# Patient Record
Sex: FEMALE | Race: WHITE | NOT HISPANIC OR LATINO | URBAN - METROPOLITAN AREA
[De-identification: names, ages, dates, MRNs, and addresses within clinical notes are randomized per-mention and may not be internally consistent; named-entity substitution may affect disease eponyms.]

---

## 2018-10-28 ENCOUNTER — EMERGENCY (EMERGENCY)
Facility: HOSPITAL | Age: 33
LOS: 1 days | Discharge: HOME | End: 2018-10-28
Attending: EMERGENCY MEDICINE
Payer: COMMERCIAL

## 2018-10-28 VITALS
DIASTOLIC BLOOD PRESSURE: 57 MMHG | HEART RATE: 70 BPM | TEMPERATURE: 98 F | SYSTOLIC BLOOD PRESSURE: 101 MMHG | RESPIRATION RATE: 18 BRPM | OXYGEN SATURATION: 97 %

## 2018-10-28 VITALS
TEMPERATURE: 99 F | WEIGHT: 106.04 LBS | DIASTOLIC BLOOD PRESSURE: 58 MMHG | SYSTOLIC BLOOD PRESSURE: 90 MMHG | RESPIRATION RATE: 16 BRPM | HEIGHT: 62 IN | HEART RATE: 71 BPM | OXYGEN SATURATION: 96 %

## 2018-10-28 LAB
ALBUMIN SERPL ELPH-MCNC: 4.1 G/DL — SIGNIFICANT CHANGE UP (ref 3.3–5)
ALP SERPL-CCNC: 63 U/L — SIGNIFICANT CHANGE UP (ref 40–120)
ALT FLD-CCNC: 16 U/L — SIGNIFICANT CHANGE UP (ref 10–45)
ANION GAP SERPL CALC-SCNC: 14 MMOL/L — SIGNIFICANT CHANGE UP (ref 5–17)
APPEARANCE UR: CLEAR — SIGNIFICANT CHANGE UP
AST SERPL-CCNC: 21 U/L — SIGNIFICANT CHANGE UP (ref 10–40)
BACTERIA # UR AUTO: ABNORMAL
BASOPHILS # BLD AUTO: 0.1 K/UL — SIGNIFICANT CHANGE UP (ref 0–0.2)
BASOPHILS NFR BLD AUTO: 0.7 % — SIGNIFICANT CHANGE UP (ref 0–2)
BILIRUB SERPL-MCNC: 0.2 MG/DL — SIGNIFICANT CHANGE UP (ref 0.2–1.2)
BILIRUB UR-MCNC: NEGATIVE — SIGNIFICANT CHANGE UP
BUN SERPL-MCNC: 10 MG/DL — SIGNIFICANT CHANGE UP (ref 7–23)
CALCIUM SERPL-MCNC: 9.2 MG/DL — SIGNIFICANT CHANGE UP (ref 8.4–10.5)
CHLORIDE SERPL-SCNC: 102 MMOL/L — SIGNIFICANT CHANGE UP (ref 96–108)
CO2 SERPL-SCNC: 19 MMOL/L — LOW (ref 22–31)
COLOR SPEC: SIGNIFICANT CHANGE UP
CREAT SERPL-MCNC: 0.55 MG/DL — SIGNIFICANT CHANGE UP (ref 0.5–1.3)
DIFF PNL FLD: NEGATIVE — SIGNIFICANT CHANGE UP
EOSINOPHIL # BLD AUTO: 0.2 K/UL — SIGNIFICANT CHANGE UP (ref 0–0.5)
EOSINOPHIL NFR BLD AUTO: 1.7 % — SIGNIFICANT CHANGE UP (ref 0–6)
EPI CELLS # UR: 3 /HPF — SIGNIFICANT CHANGE UP
GLUCOSE SERPL-MCNC: 85 MG/DL — SIGNIFICANT CHANGE UP (ref 70–99)
GLUCOSE UR QL: NEGATIVE — SIGNIFICANT CHANGE UP
HCG SERPL-ACNC: POSITIVE MIU/ML — SIGNIFICANT CHANGE UP
HCT VFR BLD CALC: 39.4 % — SIGNIFICANT CHANGE UP (ref 34.5–45)
HGB BLD-MCNC: 13.9 G/DL — SIGNIFICANT CHANGE UP (ref 11.5–15.5)
HYALINE CASTS # UR AUTO: 0 /LPF — SIGNIFICANT CHANGE UP (ref 0–2)
KETONES UR-MCNC: NEGATIVE — SIGNIFICANT CHANGE UP
LEUKOCYTE ESTERASE UR-ACNC: NEGATIVE — SIGNIFICANT CHANGE UP
LYMPHOCYTES # BLD AUTO: 2.4 K/UL — SIGNIFICANT CHANGE UP (ref 1–3.3)
LYMPHOCYTES # BLD AUTO: 24.2 % — SIGNIFICANT CHANGE UP (ref 13–44)
MAGNESIUM SERPL-MCNC: 2.1 MG/DL — SIGNIFICANT CHANGE UP (ref 1.6–2.6)
MCHC RBC-ENTMCNC: 33.7 PG — SIGNIFICANT CHANGE UP (ref 27–34)
MCHC RBC-ENTMCNC: 35.3 GM/DL — SIGNIFICANT CHANGE UP (ref 32–36)
MCV RBC AUTO: 95.5 FL — SIGNIFICANT CHANGE UP (ref 80–100)
MONOCYTES # BLD AUTO: 1.1 K/UL — HIGH (ref 0–0.9)
MONOCYTES NFR BLD AUTO: 11.1 % — SIGNIFICANT CHANGE UP (ref 2–14)
NEUTROPHILS # BLD AUTO: 6.1 K/UL — SIGNIFICANT CHANGE UP (ref 1.8–7.4)
NEUTROPHILS NFR BLD AUTO: 62.3 % — SIGNIFICANT CHANGE UP (ref 43–77)
NITRITE UR-MCNC: NEGATIVE — SIGNIFICANT CHANGE UP
PH UR: 6 — SIGNIFICANT CHANGE UP (ref 5–8)
PHOSPHATE SERPL-MCNC: 4.3 MG/DL — SIGNIFICANT CHANGE UP (ref 2.5–4.5)
PLATELET # BLD AUTO: 312 K/UL — SIGNIFICANT CHANGE UP (ref 150–400)
POTASSIUM SERPL-MCNC: 3.8 MMOL/L — SIGNIFICANT CHANGE UP (ref 3.5–5.3)
POTASSIUM SERPL-SCNC: 3.8 MMOL/L — SIGNIFICANT CHANGE UP (ref 3.5–5.3)
PROT SERPL-MCNC: 7.3 G/DL — SIGNIFICANT CHANGE UP (ref 6–8.3)
PROT UR-MCNC: SIGNIFICANT CHANGE UP
RBC # BLD: 4.13 M/UL — SIGNIFICANT CHANGE UP (ref 3.8–5.2)
RBC # FLD: 10.8 % — SIGNIFICANT CHANGE UP (ref 10.3–14.5)
RBC CASTS # UR COMP ASSIST: 23 /HPF — HIGH (ref 0–4)
SODIUM SERPL-SCNC: 135 MMOL/L — SIGNIFICANT CHANGE UP (ref 135–145)
SP GR SPEC: 1.02 — SIGNIFICANT CHANGE UP (ref 1.01–1.02)
UROBILINOGEN FLD QL: NEGATIVE — SIGNIFICANT CHANGE UP
WBC # BLD: 9.8 K/UL — SIGNIFICANT CHANGE UP (ref 3.8–10.5)
WBC # FLD AUTO: 9.8 K/UL — SIGNIFICANT CHANGE UP (ref 3.8–10.5)
WBC UR QL: 2 /HPF — SIGNIFICANT CHANGE UP (ref 0–5)

## 2018-10-28 PROCEDURE — 81001 URINALYSIS AUTO W/SCOPE: CPT

## 2018-10-28 PROCEDURE — 76817 TRANSVAGINAL US OBSTETRIC: CPT

## 2018-10-28 PROCEDURE — 87086 URINE CULTURE/COLONY COUNT: CPT

## 2018-10-28 PROCEDURE — 99284 EMERGENCY DEPT VISIT MOD MDM: CPT

## 2018-10-28 PROCEDURE — 84100 ASSAY OF PHOSPHORUS: CPT

## 2018-10-28 PROCEDURE — 80053 COMPREHEN METABOLIC PANEL: CPT

## 2018-10-28 PROCEDURE — 76817 TRANSVAGINAL US OBSTETRIC: CPT | Mod: 26

## 2018-10-28 PROCEDURE — 83735 ASSAY OF MAGNESIUM: CPT

## 2018-10-28 PROCEDURE — 84702 CHORIONIC GONADOTROPIN TEST: CPT

## 2018-10-28 PROCEDURE — 85027 COMPLETE CBC AUTOMATED: CPT

## 2018-10-28 RX ORDER — SODIUM CHLORIDE 9 MG/ML
1000 INJECTION INTRAMUSCULAR; INTRAVENOUS; SUBCUTANEOUS ONCE
Qty: 0 | Refills: 0 | Status: COMPLETED | OUTPATIENT
Start: 2018-10-28 | End: 2018-10-28

## 2018-10-28 RX ORDER — ONDANSETRON 8 MG/1
1 TABLET, FILM COATED ORAL
Qty: 12 | Refills: 0 | OUTPATIENT
Start: 2018-10-28 | End: 2018-10-30

## 2018-10-28 RX ORDER — ONDANSETRON 8 MG/1
4 TABLET, FILM COATED ORAL ONCE
Qty: 0 | Refills: 0 | Status: COMPLETED | OUTPATIENT
Start: 2018-10-28 | End: 2018-10-28

## 2018-10-28 RX ADMIN — SODIUM CHLORIDE 1000 MILLILITER(S): 9 INJECTION INTRAMUSCULAR; INTRAVENOUS; SUBCUTANEOUS at 20:36

## 2018-10-28 RX ADMIN — ONDANSETRON 4 MILLIGRAM(S): 8 TABLET, FILM COATED ORAL at 20:36

## 2018-10-28 NOTE — ED PROVIDER NOTE - ATTENDING CONTRIBUTION TO CARE
attending Nida: 32yF  at 7 weeks gestation with recent diagnosis of inevitable  last week, s/p Rhogam p/w generalized malaise, nausea and abdominal cramping. Following with OBGYN with possible plan for D&C if fetus does not pass spontaneously. Reports scant vaginal spotting. No syncope. On exam, well-appearing, VSS, abdomen soft/NT. Will obtain labs, IVF, symptomatic treatment, TVUS, UA and reassess

## 2018-10-28 NOTE — ED PROVIDER NOTE - CARE PLAN
Principal Discharge DX:	Spontaneous   Assessment and plan of treatment:	Follow up with your OBGYN doctor within the next 3 days  Follow up with your Primary Care Physician within the next 3 days  Take Zofran 4mg oral every 6 hours as needed for nausea  Bring a copy of your test results with you to your appointment  Continue your current medication regimen  Return to the Emergency Room if you experience new or worsening symptoms

## 2018-10-28 NOTE — ED ADULT NURSE NOTE - OBJECTIVE STATEMENT
32 year old female presented to ED from home with c/o of nausea/vomiting starting today. Pt went to [place] on Wednesday after having mild bleeding and discharge, pt was 7 weeks pregnant, found no heart beat at [place]. On Thursday, pt received Rhogam. Since Thursday, pt has been nauseas, lightheaded. Pt denies CP, SOB, numbness/tingling, fever, cough, chills, blurred vision, neuro intact. Pt a&ox3, lung sounds clear, heart rate regular, abdomen soft nontender nondistended to palp. Skin intact. IV in right AC 20G and patent. pt currently has no bleeding/discharge. Will continue to monitor and assess while offering support and reassurance.

## 2018-10-28 NOTE — ED ADULT NURSE NOTE - NSIMPLEMENTINTERV_GEN_ALL_ED
Implemented All Universal Safety Interventions:  [place] to call system. Call bell, personal items and telephone within reach. Instruct patient to call for assistance. Room bathroom lighting operational. Non-slip footwear when patient is off stretcher. Physically safe environment: no spills, clutter or unnecessary equipment. Stretcher in lowest position, wheels locked, appropriate side rails in place.

## 2018-10-28 NOTE — ED PROVIDER NOTE - OBJECTIVE STATEMENT
32 year old female  at 7 weeks gestation went to [place] and had a transvaginal ultrasound which revealed a subchorionic hemorrhage and a low fetal heart rate on Wednesday. She went to her OB Dr Everardo Coughlin on Thursday who could not find a fetal heart rate, and administered Rhogam in the office. He discussed a d&c with the patient which she declined. Since then she has had nausea, intermittent back pain, abdominal cramping. She also feels very weak for the past 3 days which is worse when she stands up.

## 2018-10-28 NOTE — ED PROVIDER NOTE - PLAN OF CARE
Follow up with your OBGYN doctor within the next 3 days  Follow up with your Primary Care Physician within the next 3 days  Take Zofran 4mg oral every 6 hours as needed for nausea  Bring a copy of your test results with you to your appointment  Continue your current medication regimen  Return to the Emergency Room if you experience new or worsening symptoms

## 2018-10-29 LAB
CULTURE RESULTS: SIGNIFICANT CHANGE UP
SPECIMEN SOURCE: SIGNIFICANT CHANGE UP

## 2018-10-31 ENCOUNTER — OUTPATIENT (OUTPATIENT)
Dept: OUTPATIENT SERVICES | Facility: HOSPITAL | Age: 33
LOS: 1 days | End: 2018-10-31
Payer: COMMERCIAL

## 2018-10-31 ENCOUNTER — TRANSCRIPTION ENCOUNTER (OUTPATIENT)
Age: 33
End: 2018-10-31

## 2018-10-31 VITALS
DIASTOLIC BLOOD PRESSURE: 53 MMHG | TEMPERATURE: 99 F | WEIGHT: 106.04 LBS | SYSTOLIC BLOOD PRESSURE: 91 MMHG | HEART RATE: 68 BPM | OXYGEN SATURATION: 98 % | HEIGHT: 62 IN | RESPIRATION RATE: 16 BRPM

## 2018-10-31 DIAGNOSIS — O02.1 MISSED ABORTION: ICD-10-CM

## 2018-10-31 DIAGNOSIS — Z87.59 PERSONAL HISTORY OF OTHER COMPLICATIONS OF PREGNANCY, CHILDBIRTH AND THE PUERPERIUM: Chronic | ICD-10-CM

## 2018-10-31 LAB
ANTIBODY ID 1_1: SIGNIFICANT CHANGE UP
BLD GP AB SCN SERPL QL: POSITIVE — SIGNIFICANT CHANGE UP
RH IG SCN BLD-IMP: NEGATIVE — SIGNIFICANT CHANGE UP

## 2018-10-31 PROCEDURE — 86850 RBC ANTIBODY SCREEN: CPT

## 2018-10-31 PROCEDURE — 86900 BLOOD TYPING SEROLOGIC ABO: CPT

## 2018-10-31 PROCEDURE — 86077 PHYS BLOOD BANK SERV XMATCH: CPT

## 2018-10-31 PROCEDURE — 86870 RBC ANTIBODY IDENTIFICATION: CPT

## 2018-10-31 PROCEDURE — 86901 BLOOD TYPING SEROLOGIC RH(D): CPT

## 2018-10-31 PROCEDURE — G0463: CPT

## 2018-10-31 RX ORDER — SODIUM CHLORIDE 9 MG/ML
3 INJECTION INTRAMUSCULAR; INTRAVENOUS; SUBCUTANEOUS EVERY 8 HOURS
Qty: 0 | Refills: 0 | Status: DISCONTINUED | OUTPATIENT
Start: 2018-11-01 | End: 2018-11-16

## 2018-10-31 RX ORDER — LIDOCAINE HCL 20 MG/ML
0.2 VIAL (ML) INJECTION ONCE
Qty: 0 | Refills: 0 | Status: DISCONTINUED | OUTPATIENT
Start: 2018-11-01 | End: 2018-11-16

## 2018-10-31 NOTE — H&P PST ADULT - NSANTHOSAYNRD_GEN_A_CORE
No. MORRO screening performed.  STOP BANG Legend: 0-2 = LOW Risk; 3-4 = INTERMEDIATE Risk; 5-8 = HIGH Risk

## 2018-10-31 NOTE — H&P PST ADULT - HISTORY OF PRESENT ILLNESS
33 Y/O Female H/O migraines 31 Y/O Female  0 3 0  H/O migraines. Pt reports she is 7 weeks pregnant. Pt went to ER 10/28/18 C/O N/V , denies vaginal bleeding.  N/V resolved after pt  received IV Fluids, S/P fetal  ultra revealed no fetal heart beat. Presents for dilation & suction curettage 18. 33 Y/O Female  0 3 0  H/O migraines. Pt reports she is 7 weeks pregnant. Pt went to ER 10/28/18 C/O N/V & pelvic pain , denies vaginal bleeding.  N/V resolved after pt  received IV Fluids, S/P fetal  ultra revealed no fetal heart beat. Presents for dilation & suction curettage 18.

## 2018-11-01 ENCOUNTER — OUTPATIENT (OUTPATIENT)
Dept: OUTPATIENT SERVICES | Facility: HOSPITAL | Age: 33
LOS: 1 days | End: 2018-11-01
Payer: COMMERCIAL

## 2018-11-01 ENCOUNTER — RESULT REVIEW (OUTPATIENT)
Age: 33
End: 2018-11-01

## 2018-11-01 VITALS
SYSTOLIC BLOOD PRESSURE: 94 MMHG | RESPIRATION RATE: 14 BRPM | OXYGEN SATURATION: 100 % | HEART RATE: 87 BPM | DIASTOLIC BLOOD PRESSURE: 53 MMHG | TEMPERATURE: 100 F

## 2018-11-01 VITALS
WEIGHT: 106.04 LBS | OXYGEN SATURATION: 98 % | RESPIRATION RATE: 18 BRPM | HEIGHT: 62 IN | HEART RATE: 69 BPM | SYSTOLIC BLOOD PRESSURE: 96 MMHG | TEMPERATURE: 99 F | DIASTOLIC BLOOD PRESSURE: 63 MMHG

## 2018-11-01 DIAGNOSIS — O02.1 MISSED ABORTION: ICD-10-CM

## 2018-11-01 DIAGNOSIS — Z87.59 PERSONAL HISTORY OF OTHER COMPLICATIONS OF PREGNANCY, CHILDBIRTH AND THE PUERPERIUM: Chronic | ICD-10-CM

## 2018-11-01 PROCEDURE — 88233 TISSUE CULTURE SKIN/BIOPSY: CPT

## 2018-11-01 PROCEDURE — 59820 CARE OF MISCARRIAGE: CPT

## 2018-11-01 PROCEDURE — 88305 TISSUE EXAM BY PATHOLOGIST: CPT | Mod: 26

## 2018-11-01 PROCEDURE — 88291 CYTO/MOLECULAR REPORT: CPT

## 2018-11-01 PROCEDURE — 88264 CHROMOSOME ANALYSIS 20-25: CPT

## 2018-11-01 PROCEDURE — 88280 CHROMOSOME KARYOTYPE STUDY: CPT

## 2018-11-01 PROCEDURE — 88305 TISSUE EXAM BY PATHOLOGIST: CPT

## 2018-11-01 RX ORDER — CELECOXIB 200 MG/1
200 CAPSULE ORAL ONCE
Qty: 0 | Refills: 0 | Status: COMPLETED | OUTPATIENT
Start: 2018-11-01 | End: 2018-11-01

## 2018-11-01 RX ORDER — OXYCODONE HYDROCHLORIDE 5 MG/1
5 TABLET ORAL ONCE
Qty: 0 | Refills: 0 | Status: DISCONTINUED | OUTPATIENT
Start: 2018-11-01 | End: 2018-11-01

## 2018-11-01 RX ORDER — OXYCODONE HYDROCHLORIDE 5 MG/1
10 TABLET ORAL ONCE
Qty: 0 | Refills: 0 | Status: DISCONTINUED | OUTPATIENT
Start: 2018-11-01 | End: 2018-11-01

## 2018-11-01 RX ORDER — SODIUM CHLORIDE 9 MG/ML
1000 INJECTION, SOLUTION INTRAVENOUS
Qty: 0 | Refills: 0 | Status: DISCONTINUED | OUTPATIENT
Start: 2018-11-01 | End: 2018-11-16

## 2018-11-01 RX ORDER — ONDANSETRON 8 MG/1
4 TABLET, FILM COATED ORAL ONCE
Qty: 0 | Refills: 0 | Status: COMPLETED | OUTPATIENT
Start: 2018-11-01 | End: 2018-11-01

## 2018-11-01 RX ORDER — FAMOTIDINE 10 MG/ML
20 INJECTION INTRAVENOUS ONCE
Qty: 0 | Refills: 0 | Status: DISCONTINUED | OUTPATIENT
Start: 2018-11-01 | End: 2018-11-16

## 2018-11-01 RX ORDER — ACETAMINOPHEN 500 MG
1000 TABLET ORAL ONCE
Qty: 0 | Refills: 0 | Status: COMPLETED | OUTPATIENT
Start: 2018-11-01 | End: 2018-11-01

## 2018-11-01 RX ADMIN — Medication 1000 MILLIGRAM(S): at 13:58

## 2018-11-01 RX ADMIN — SODIUM CHLORIDE 3 MILLILITER(S): 9 INJECTION INTRAMUSCULAR; INTRAVENOUS; SUBCUTANEOUS at 14:00

## 2018-11-01 RX ADMIN — CELECOXIB 200 MILLIGRAM(S): 200 CAPSULE ORAL at 16:01

## 2018-11-01 RX ADMIN — ONDANSETRON 4 MILLIGRAM(S): 8 TABLET, FILM COATED ORAL at 16:01

## 2018-11-01 RX ADMIN — CELECOXIB 200 MILLIGRAM(S): 200 CAPSULE ORAL at 13:58

## 2018-11-01 NOTE — PRE-ANESTHESIA EVALUATION ADULT - NSANTHADDINFOFT_GEN_ALL_CORE
Denies CP, SOB, cough. had 99 fever last week, feeling ok now. Had esophageal ulcer 2008, healed , no meds since then, GERD occasional, OK today

## 2018-11-01 NOTE — BRIEF OPERATIVE NOTE - PROCEDURE
<<-----Click on this checkbox to enter Procedure Dilation and curettage of uterus using suction for missed  in first trimester  2018    Active  [id number]

## 2018-11-06 LAB — SURGICAL PATHOLOGY STUDY: SIGNIFICANT CHANGE UP

## 2018-11-15 LAB — CHROM ANALY OVERALL INTERP SPEC-IMP: SIGNIFICANT CHANGE UP

## 2018-12-31 ENCOUNTER — EMERGENCY (EMERGENCY)
Facility: HOSPITAL | Age: 33
LOS: 1 days | End: 2018-12-31
Attending: EMERGENCY MEDICINE
Payer: COMMERCIAL

## 2018-12-31 VITALS
HEIGHT: 62 IN | DIASTOLIC BLOOD PRESSURE: 60 MMHG | HEART RATE: 18 BPM | TEMPERATURE: 98 F | RESPIRATION RATE: 78 BRPM | WEIGHT: 106.92 LBS | OXYGEN SATURATION: 98 % | SYSTOLIC BLOOD PRESSURE: 97 MMHG

## 2018-12-31 DIAGNOSIS — Z87.59 PERSONAL HISTORY OF OTHER COMPLICATIONS OF PREGNANCY, CHILDBIRTH AND THE PUERPERIUM: Chronic | ICD-10-CM

## 2018-12-31 PROBLEM — O02.1 MISSED ABORTION: Chronic | Status: ACTIVE | Noted: 2018-10-31

## 2018-12-31 PROBLEM — K22.10 ULCER OF ESOPHAGUS WITHOUT BLEEDING: Chronic | Status: ACTIVE | Noted: 2018-10-31

## 2018-12-31 PROBLEM — N39.0 URINARY TRACT INFECTION, SITE NOT SPECIFIED: Chronic | Status: ACTIVE | Noted: 2018-10-31

## 2018-12-31 PROBLEM — G43.909 MIGRAINE, UNSPECIFIED, NOT INTRACTABLE, WITHOUT STATUS MIGRAINOSUS: Chronic | Status: ACTIVE | Noted: 2018-10-31

## 2018-12-31 LAB
ALBUMIN SERPL ELPH-MCNC: 4.3 G/DL — SIGNIFICANT CHANGE UP (ref 3.3–5)
ALP SERPL-CCNC: 78 U/L — SIGNIFICANT CHANGE UP (ref 40–120)
ALT FLD-CCNC: 16 U/L — SIGNIFICANT CHANGE UP (ref 10–45)
ANION GAP SERPL CALC-SCNC: 13 MMOL/L — SIGNIFICANT CHANGE UP (ref 5–17)
APPEARANCE UR: CLEAR — SIGNIFICANT CHANGE UP
AST SERPL-CCNC: 14 U/L — SIGNIFICANT CHANGE UP (ref 10–40)
BACTERIA # UR AUTO: NEGATIVE — SIGNIFICANT CHANGE UP
BASOPHILS # BLD AUTO: 0 K/UL — SIGNIFICANT CHANGE UP (ref 0–0.2)
BASOPHILS NFR BLD AUTO: 0.1 % — SIGNIFICANT CHANGE UP (ref 0–2)
BILIRUB SERPL-MCNC: 0.5 MG/DL — SIGNIFICANT CHANGE UP (ref 0.2–1.2)
BILIRUB UR-MCNC: NEGATIVE — SIGNIFICANT CHANGE UP
BUN SERPL-MCNC: 8 MG/DL — SIGNIFICANT CHANGE UP (ref 7–23)
CALCIUM SERPL-MCNC: 9.2 MG/DL — SIGNIFICANT CHANGE UP (ref 8.4–10.5)
CHLORIDE SERPL-SCNC: 105 MMOL/L — SIGNIFICANT CHANGE UP (ref 96–108)
CO2 SERPL-SCNC: 23 MMOL/L — SIGNIFICANT CHANGE UP (ref 22–31)
COLOR SPEC: COLORLESS — SIGNIFICANT CHANGE UP
CREAT SERPL-MCNC: 0.62 MG/DL — SIGNIFICANT CHANGE UP (ref 0.5–1.3)
DIFF PNL FLD: NEGATIVE — SIGNIFICANT CHANGE UP
EOSINOPHIL # BLD AUTO: 0.1 K/UL — SIGNIFICANT CHANGE UP (ref 0–0.5)
EOSINOPHIL NFR BLD AUTO: 0.4 % — SIGNIFICANT CHANGE UP (ref 0–6)
EPI CELLS # UR: 0 — SIGNIFICANT CHANGE UP
GAS PNL BLDV: SIGNIFICANT CHANGE UP
GLUCOSE SERPL-MCNC: 95 MG/DL — SIGNIFICANT CHANGE UP (ref 70–99)
GLUCOSE UR QL: NEGATIVE — SIGNIFICANT CHANGE UP
HCG SERPL-ACNC: 2.4 MIU/ML — SIGNIFICANT CHANGE UP
HCT VFR BLD CALC: 41 % — SIGNIFICANT CHANGE UP (ref 34.5–45)
HGB BLD-MCNC: 14.1 G/DL — SIGNIFICANT CHANGE UP (ref 11.5–15.5)
HYALINE CASTS # UR AUTO: 0 /LPF — SIGNIFICANT CHANGE UP (ref 0–7)
KETONES UR-MCNC: NEGATIVE — SIGNIFICANT CHANGE UP
LEUKOCYTE ESTERASE UR-ACNC: NEGATIVE — SIGNIFICANT CHANGE UP
LYMPHOCYTES # BLD AUTO: 1.1 K/UL — SIGNIFICANT CHANGE UP (ref 1–3.3)
LYMPHOCYTES # BLD AUTO: 6.6 % — LOW (ref 13–44)
MCHC RBC-ENTMCNC: 33.4 PG — SIGNIFICANT CHANGE UP (ref 27–34)
MCHC RBC-ENTMCNC: 34.4 GM/DL — SIGNIFICANT CHANGE UP (ref 32–36)
MCV RBC AUTO: 97.3 FL — SIGNIFICANT CHANGE UP (ref 80–100)
MONOCYTES # BLD AUTO: 1.2 K/UL — HIGH (ref 0–0.9)
MONOCYTES NFR BLD AUTO: 6.8 % — SIGNIFICANT CHANGE UP (ref 2–14)
NEUTROPHILS # BLD AUTO: 14.7 K/UL — HIGH (ref 1.8–7.4)
NEUTROPHILS NFR BLD AUTO: 86.1 % — HIGH (ref 43–77)
NITRITE UR-MCNC: NEGATIVE — SIGNIFICANT CHANGE UP
PH UR: 7 — SIGNIFICANT CHANGE UP (ref 5–8)
PLATELET # BLD AUTO: 330 K/UL — SIGNIFICANT CHANGE UP (ref 150–400)
POTASSIUM SERPL-MCNC: 3.8 MMOL/L — SIGNIFICANT CHANGE UP (ref 3.5–5.3)
POTASSIUM SERPL-SCNC: 3.8 MMOL/L — SIGNIFICANT CHANGE UP (ref 3.5–5.3)
PROT SERPL-MCNC: 7.1 G/DL — SIGNIFICANT CHANGE UP (ref 6–8.3)
PROT UR-MCNC: NEGATIVE — SIGNIFICANT CHANGE UP
RBC # BLD: 4.22 M/UL — SIGNIFICANT CHANGE UP (ref 3.8–5.2)
RBC # FLD: 11 % — SIGNIFICANT CHANGE UP (ref 10.3–14.5)
RBC CASTS # UR COMP ASSIST: 9 /HPF — HIGH (ref 0–4)
SODIUM SERPL-SCNC: 141 MMOL/L — SIGNIFICANT CHANGE UP (ref 135–145)
SP GR SPEC: 1.02 — SIGNIFICANT CHANGE UP (ref 1.01–1.02)
UROBILINOGEN FLD QL: NEGATIVE — SIGNIFICANT CHANGE UP
WBC # BLD: 17.1 K/UL — HIGH (ref 3.8–10.5)
WBC # FLD AUTO: 17.1 K/UL — HIGH (ref 3.8–10.5)
WBC UR QL: 0 /HPF — SIGNIFICANT CHANGE UP (ref 0–5)

## 2018-12-31 PROCEDURE — 99218: CPT

## 2018-12-31 PROCEDURE — 74177 CT ABD & PELVIS W/CONTRAST: CPT | Mod: 26

## 2018-12-31 PROCEDURE — 76830 TRANSVAGINAL US NON-OB: CPT | Mod: 26

## 2018-12-31 PROCEDURE — 93975 VASCULAR STUDY: CPT | Mod: 26

## 2018-12-31 PROCEDURE — 76817 TRANSVAGINAL US OBSTETRIC: CPT | Mod: 26

## 2018-12-31 RX ORDER — SODIUM CHLORIDE 9 MG/ML
1000 INJECTION, SOLUTION INTRAVENOUS ONCE
Qty: 0 | Refills: 0 | Status: COMPLETED | OUTPATIENT
Start: 2018-12-31 | End: 2018-12-31

## 2018-12-31 RX ORDER — SODIUM CHLORIDE 9 MG/ML
1000 INJECTION INTRAMUSCULAR; INTRAVENOUS; SUBCUTANEOUS ONCE
Qty: 0 | Refills: 0 | Status: COMPLETED | OUTPATIENT
Start: 2018-12-31 | End: 2018-12-31

## 2018-12-31 RX ORDER — OXYCODONE HYDROCHLORIDE 5 MG/1
1 TABLET ORAL
Qty: 16 | Refills: 0 | OUTPATIENT
Start: 2018-12-31 | End: 2019-01-03

## 2018-12-31 RX ORDER — MORPHINE SULFATE 50 MG/1
4 CAPSULE, EXTENDED RELEASE ORAL ONCE
Qty: 0 | Refills: 0 | Status: DISCONTINUED | OUTPATIENT
Start: 2018-12-31 | End: 2018-12-31

## 2018-12-31 RX ORDER — CEFTRIAXONE 500 MG/1
250 INJECTION, POWDER, FOR SOLUTION INTRAMUSCULAR; INTRAVENOUS ONCE
Qty: 0 | Refills: 0 | Status: DISCONTINUED | OUTPATIENT
Start: 2018-12-31 | End: 2018-12-31

## 2018-12-31 RX ORDER — SODIUM CHLORIDE 9 MG/ML
1000 INJECTION, SOLUTION INTRAVENOUS
Qty: 0 | Refills: 0 | Status: DISCONTINUED | OUTPATIENT
Start: 2018-12-31 | End: 2019-01-04

## 2018-12-31 RX ORDER — SODIUM CHLORIDE 9 MG/ML
3 INJECTION INTRAMUSCULAR; INTRAVENOUS; SUBCUTANEOUS EVERY 8 HOURS
Qty: 0 | Refills: 0 | Status: DISCONTINUED | OUTPATIENT
Start: 2018-12-31 | End: 2019-01-04

## 2018-12-31 RX ORDER — CEFTRIAXONE 500 MG/1
1 INJECTION, POWDER, FOR SOLUTION INTRAMUSCULAR; INTRAVENOUS ONCE
Qty: 0 | Refills: 0 | Status: COMPLETED | OUTPATIENT
Start: 2018-12-31 | End: 2018-12-31

## 2018-12-31 RX ORDER — FAMOTIDINE 10 MG/ML
20 INJECTION INTRAVENOUS ONCE
Qty: 0 | Refills: 0 | Status: COMPLETED | OUTPATIENT
Start: 2018-12-31 | End: 2018-12-31

## 2018-12-31 RX ORDER — SODIUM CHLORIDE 9 MG/ML
1000 INJECTION INTRAMUSCULAR; INTRAVENOUS; SUBCUTANEOUS
Qty: 0 | Refills: 0 | Status: DISCONTINUED | OUTPATIENT
Start: 2018-12-31 | End: 2019-01-01

## 2018-12-31 RX ADMIN — FAMOTIDINE 20 MILLIGRAM(S): 10 INJECTION INTRAVENOUS at 18:19

## 2018-12-31 RX ADMIN — MORPHINE SULFATE 4 MILLIGRAM(S): 50 CAPSULE, EXTENDED RELEASE ORAL at 14:22

## 2018-12-31 RX ADMIN — MORPHINE SULFATE 4 MILLIGRAM(S): 50 CAPSULE, EXTENDED RELEASE ORAL at 13:50

## 2018-12-31 RX ADMIN — MORPHINE SULFATE 4 MILLIGRAM(S): 50 CAPSULE, EXTENDED RELEASE ORAL at 18:19

## 2018-12-31 RX ADMIN — Medication 110 MILLIGRAM(S): at 19:40

## 2018-12-31 RX ADMIN — CEFTRIAXONE 100 GRAM(S): 500 INJECTION, POWDER, FOR SOLUTION INTRAMUSCULAR; INTRAVENOUS at 18:23

## 2018-12-31 RX ADMIN — SODIUM CHLORIDE 125 MILLILITER(S): 9 INJECTION, SOLUTION INTRAVENOUS at 22:06

## 2018-12-31 RX ADMIN — SODIUM CHLORIDE 1000 MILLILITER(S): 9 INJECTION, SOLUTION INTRAVENOUS at 13:34

## 2018-12-31 RX ADMIN — SODIUM CHLORIDE 1000 MILLILITER(S): 9 INJECTION INTRAMUSCULAR; INTRAVENOUS; SUBCUTANEOUS at 18:19

## 2018-12-31 RX ADMIN — MORPHINE SULFATE 4 MILLIGRAM(S): 50 CAPSULE, EXTENDED RELEASE ORAL at 22:05

## 2018-12-31 NOTE — ED CDU PROVIDER INITIAL DAY NOTE - OBJECTIVE STATEMENT
33y f w PMhx D&C on Nov 1 for miscarriage, endometritis s/p dnc now s/p antibx course, p/w severe lower abd pain over the last few days and acutely worsening since yesterday. Pt seen by OB today who could not effectively interpret TVUS and sent her to ED for further evaluation. Pt has extreme pain in lower abdomen suprapubic and umbilical region, and in both L and R adnexa. No fevers or chills. Nausea but no emesis. No diarrhea or constipation, BMs normal. 33y f w PMhx D&C on Nov 1 for miscarriage, endometritis s/p dnc now s/p antibx course, p/w severe lower abd pain over the last few days and acutely worsening since yesterday. Pt seen by OB today who could not effectively interpret TVUS and sent her to ED for further evaluation. Pt has extreme pain in lower abdomen suprapubic and umbilical region, and in both L and R adnexa. No fevers or chills. Nausea but no emesis. No diarrhea or constipation, BMs normal.   In ED, patient had laboratory significant for wbc 17.1, Transvaginal US showing No ultrasound evidence for endometritis or retained products of conception. A 1.5 x 2.2 cm complex cyst at the right adnexa. Adjacent to this there is a small amount of mildly complex fluid likely blood. This likely represent small ruptured hemorrhagic cyst, and may be responsible for the patient's pain. Patient also had CT abdomen and pelvis w/ cont showing No acute pathology. Patient was evaluated by GYN 33y f w PMhx D&C on Nov 1 for miscarriage, endometritis s/p dnc now s/p antibx course, p/w severe lower abd pain over the last few days and acutely worsening since yesterday. Pt seen by OB today who could not effectively interpret TVUS and sent her to ED for further evaluation. Pt has extreme pain in lower abdomen suprapubic and umbilical region, and in both L and R adnexa. No fevers or chills. Nausea but no emesis. No diarrhea or constipation, BMs normal.   In ED, patient had laboratory significant for wbc 17.1, Transvaginal US showing No ultrasound evidence for endometritis or retained products of conception. A 1.5 x 2.2 cm complex cyst at the right adnexa. Adjacent to this there is a small amount of mildly complex fluid likely blood. This likely represent small ruptured hemorrhagic cyst, and may be responsible for the patient's pain. Patient also had CT abdomen and pelvis w/ cont showing No acute pathology. Patient was evaluated by GYN and sent to CDU. Recommendations for Urine GC/CT, empiric abx for PID: Ceftriaxone 250mg IM x1 + Doxycycline 100mg bid for 14d, motrin 600mg po q6h and tylenol 975mg po q6h for pain.

## 2018-12-31 NOTE — ED ADULT TRIAGE NOTE - CHIEF COMPLAINT QUOTE
abdominal pain, sent from GYN because "my ovaries might be enlarged"  (recent hx of D&C november, endometritis)

## 2018-12-31 NOTE — ED PROVIDER NOTE - MEDICAL DECISION MAKING DETAILS
Attending MD Miguel: 33F with intermittent lower abd pain x several days, exam notable for b/l lower quadrant abd ttp R>L, ddx includes appendicitis, colitis, symptomatic ovarian cyst. Plan for CT a/p, labs, TVUS, re-eval

## 2018-12-31 NOTE — ED CDU PROVIDER INITIAL DAY NOTE - DETAILS
ABDOMINAL PAIN  -IVF  -PAIN CONTROL  -SERIAL ABDOMINAL EXAMS  -BRENDA CALLOWAY  -CASE D/W ATTENDING MAX

## 2018-12-31 NOTE — ED PROVIDER NOTE - ATTENDING CONTRIBUTION TO CARE
Attending MD Miguel:  I personally have seen and examined this patient.  Resident note reviewed and agree on plan of care and except where noted.  See HPI, PE, and MDM for details.

## 2018-12-31 NOTE — ED PROVIDER NOTE - OBJECTIVE STATEMENT
33y f w PMhx D&C on Nov 1 for miscarriage, endometritis s/p dnc now s/p antibx course, p/w severe lower abd pain over the last few days and acutely worsening since yesterday. Pt seen by OB today who could not effectively interpret TVUS and sent her to ED for further evaluation. Pt has extreme pain in lower abdomen suprapubic and umbilical region, and in both L and R adnexa. No fevers or chills. Nausea but no emesis. No diarrhea or constipation, BMs normal.

## 2018-12-31 NOTE — ED CDU PROVIDER INITIAL DAY NOTE - ATTENDING CONTRIBUTION TO CARE
33F, s/p d and c 11/1 for miscarriage, endometritis, presented to ED with lower abd pain x a few days. pain waxing and waning in severity, severe at worst, worsening since yesterday. Pain primarily lower abdomen in suprapubic/periumbilical region. No sig alleviating factors. Nausea, no vomiting. No dysuria, hematuria. No f/c. Denies vag bleeding, discharge. Denies hx STD.    PE: NAD, NCAT, MMM, Trachea midline, Normal conjunctiva, lungs CTAB, S1/S2 RRR, Normal perfusion, 2+ radial pulses bilat, Abdomen Soft, +Suprapubic and periumbilical ttp, No rebound/guarding, No CVA ttp, No LE edema, No deformity of extremities, No rashes,  No focal motor or sensory deficits.     While in ED, labs revealed wbc 17. A TVUS and CT a/p was performed which revealed R adnexal cyst with adjacent free fluid likely representing hemorrhagic cyst. A gyn consultation was obtained who recommended treating for PID, however ED physician's exam did not reveal CMT or cervical discharge. Abx begun. Pt's pain persisted while in ED. Pt sent to CDU for continued analgesia, serial abd exams, and re-evaluation. - Home Youngblood MD

## 2018-12-31 NOTE — ED PROVIDER NOTE - PROGRESS NOTE DETAILS
Patient pain improving after medication, still present but more bearable. No pathology on CT, ruptured small ovarian cyst on TVUS. Discussed w/ patient, she wants to go home without OB/GYN consult, pt has good follow up with her current OB. Will dc w/ pain control medication and return precautions, lab results for Ob/Gyn f/u Patient pain improving after medication, still present but more bearable. No pathology on CT, ruptured small ovarian cyst on TVUS. obgyn was consulted due to elevated wbc, unclear etiology of pain, continued suprapubic ttp and findings on gyn exam. pt was seen by obgyn, who recommended treatment for PID. pt received ceftriaxone and doxycycline. pt with persistent sbp in the 90's in the ED, pt states she does have normal sbp in the 90's. however, she states she has had sex only once since d and c and has very low suspicion of std. at this time, due to persistent pain, elevated wbc, low risk for pid, pt was offered cdu for continued observation, re-eval and repeat labs. pt discussing with family and will decide if she wants to stay. - Home Youngblood MD

## 2018-12-31 NOTE — CONSULT NOTE ADULT - ASSESSMENT
33y  presents w/ pelvic pain.  Labs significant for leukocytosis.  Imaging shows ruptured right ovarian cyst.  Exam concerning for PID vs inflammation from hemorrhagic cyst.    RECOMMENDATIONS:   - Urine GC/CT   - empiric abx for PID: Ceftriaxone 250mg IM x1 + Doxycycline 100mg bid for 14d.     - motrin 600mg po q6h and tylenol 975mg po q6h for pain   - pt should f/u in approx 48h with private GYN for reevaluation    d/w Dr. Ruben Jeong MD PGY2

## 2018-12-31 NOTE — CONSULT NOTE ADULT - SUBJECTIVE AND OBJECTIVE BOX
R2 GYN ED CONSULT NOTE    SUBJECTIVE:    32yo  w/ LMP 18 who is s/p D&C on 18 for MAb presents to [facility] ED with severe pelvic pain.  Pt states that she started to have 10/10 sharp pressure and pain in her bladder/rectum last night that was not relieved by motrin.  She endorses severe nausea due to the pain and says it is worst when she urinates or passes gas.  She had pain similar to this 1-2weeks after her D&C when she was presumed to have endometritis and was given a course of antibiotics and the pain resolved.  She denies any fevers/chills, vomiting, diarrhea, burning on urination, frequency/urgency.  She reports mild vaginal discharge since her D&C.  She denies recent illnesses/travel/sick contacts.  She is sexually active with 1 male partner.  Periods are  normally very painful and regular q1mo but she hasn't had a period since her D&C.      In ED today the pt is afebrile with VSS, labs show leukocytosis of 17.1, CTAP is wnl and TVUS shows a ruptured right ovarian cyst with adjacent free fluid in the right adnexa.      Primary OB/GYN: Alex Donaldson  OBH: ; TOPx2, MAbx1, D&Cx3  GYNH: denies hx of fibroids, ov cysts, abnl paps, sti  PMSH: migraines, D&Cx3  MEDS: none  ALL: nkda  SOCH: denies t/e/d; safe at home  FAMH: denies fam hx of breast/uterine/ovarian/colon cancer in first degree relatives  ROS: negative except per hpi    OBJECTIVE:    Vital Signs Last 24 Hrs  T(C): 37.6 (31 Dec 2018 18:13), Max: 37.6 (31 Dec 2018 18:13)  T(F): 99.6 (31 Dec 2018 18:13), Max: 99.6 (31 Dec 2018 18:13)  HR: 72 (31 Dec 2018 18:13) (18 - 80)  BP: 95/61 (31 Dec 2018 18:13) (95/61 - 109/73)  RR: 18 (31 Dec 2018 18:13) (18 - 78)  SpO2: 98% (31 Dec 2018 18:13) (96% - 98%)  Height (cm): 157.48 (18 @ 12:00)  Weight (kg): 48.5 (18 @ 12:00)  BMI (kg/m2): 19.6 (18 @ 12:00)  BSA (m2): 1.46 (18 @ 12:00)    PHYSICAL EXAM:  GEN: NAD, A&Ox3  CV: RRR, no m/g/r  LUNGS: CTAB  ABD: soft, ND, +BS, diffusely tender over lower quadrants  SPECULUM: white vaginal discharge, erythematous vaginal mucosa, no lesions, cervix closed  PELVIC: exquiste tenderness diffusely including b/l adnexal tenderness and +CMT  EXT: WWP, no edema/TTP    LABS:                        14.1   17.1  )-----------( 330      ( 31 Dec 2018 13:52 )             41.0   baso 0.1    eos 0.4    imm gran x      lymph 6.6    mono 6.8    poly 86.1         141  |  105  |  8   ----------------------------<  95  3.8   |  23  |  0.62    Ca    9.2      31 Dec 2018 13:52    TPro  7.1  /  Alb  4.3  /  TBili  0.5  /  DBili  x   /  AST  14  /  ALT  16  /  AlkPhos  78        Urinalysis Basic - ( 31 Dec 2018 16:18 )  Color: Colorless / Appearance: Clear / S.019 / pH: x  Gluc: x / Ketone: Negative  / Bili: Negative / Urobili: Negative   Blood: x / Protein: Negative / Nitrite: Negative   Leuk Esterase: Negative / RBC: 9 /hpf / WBC 0 /HPF   Sq Epi: x / Non Sq Epi: 0 / Bacteria: Negative        RADIOLOGY:    < from: US Doppler Pelvis (18 @ 14:51) >  EXAM:  US TRANSVAGINAL                      EXAM:  US DPLX PELVIC                        PROCEDURE DATE:  2018    INTERPRETATION:  CLINICAL INFORMATION: Pelvic pain. Miscarriage and D&C on 2018. Recent endometritis on antibiotics.  LMP: 2018  COMPARISON: Pelvic ultrasound 10/28/2018  TECHNIQUE:  Endovaginal and transabdominal pelvic sonogram. Color and Spectral Doppler was performed.  FINDINGS:  Uterus: 7.9 x 3.7 x 5.5 cm. Within normal limits.  Endometrium: 7 mm. Within normal limits.  Right ovary: 4.0 x 2.5 x 2.5 cm. 1.5 x 2.2 cm involuting cyst at the right adnexa. Adjacent to this there is a small amount of mildly complex fluid likely blood. This likely represent small rupturedhemorrhagic cyst.  Left ovary: 2.4 x 2.4 x 2.1 cm. Within normal limits.  Fluid: Small amount of mildly complex free fluid at the right adnexa.  IMPRESSION: No ultrasound evidence for endometritis or retained products of conception. Correlate with beta hCG.  1.5 x 2.2 cm complex cyst at the right adnexa. Adjacent to this there is a small amount of mildly complex fluid likely blood. This likely represent small ruptured hemorrhagic cyst, and may be responsible for the   patient's pain. Correlate clinically.    SARAH BETH CAROLINA M.D., ATTENDING RADIOLOGIST  This document has been electronically signed. Dec 31 2018  3:19PM  < end of copied text >      < from: CT Abdomen and Pelvis w/ Oral Cont and w/ IV Cont (18 @ 15:34) >  EXAM:  CT ABDOMEN AND PELVIS OC IC                        PROCEDURE DATE:  2018    INTERPRETATION:  CLINICAL INFORMATION: Diffuse abdominal pain. Evaluate for tubo-ovarian pathology versus gastroenteritis.       COMPARISON: Pelvic sonogram same day  PROCEDURE: CT of the Abdomen and Pelvis was performed with intravenous contrast. Intravenous contrast: 90 ml Omnipaque 350. 10 ml discarded.Oral contrast: positive contrast was administered. Sagittal and coronal reformats were performed.  FINDINGS:  LOWER CHEST: Within normal limits.  LIVER: A few scattered subcentimeter hypodense hepatic foci, too small to characterize.  BILE DUCTS: Normal caliber.  GALLBLADDER: Within normal limits.  SPLEEN: Within normallimits.  PANCREAS: Within normal limits.  ADRENALS: Within normal limits.  KIDNEYS/URETERS: Within normal limits.  BLADDER: Within normal limits.  REPRODUCTIVE ORGANS: Small follicles and/or cysts t bilaterally.  Correlate with pelvic ultrasound same day.  BOWEL: No bowel obstruction. Appendix not visualized. No right lower  quadrant inflammation to suggest appendicitis.     PERITONEUM: No ascites.  VESSELS:  Within normal limits.  RETROPERITONEUM: No lymphadenopathy.    ABDOMINAL WALL: Within normal limits.  BONES: Within normal limits.    IMPRESSION: No acute pathology.    NICOLSA ELLSWORTH M.D., ATTENDING RADIOLOGIST  This document has been electronically signed. Dec 31 2018  3:45PM    < end of copied text >

## 2019-01-01 VITALS
SYSTOLIC BLOOD PRESSURE: 91 MMHG | HEART RATE: 77 BPM | DIASTOLIC BLOOD PRESSURE: 58 MMHG | OXYGEN SATURATION: 99 % | TEMPERATURE: 99 F | RESPIRATION RATE: 18 BRPM

## 2019-01-01 LAB
BASOPHILS # BLD AUTO: 0 K/UL — SIGNIFICANT CHANGE UP (ref 0–0.2)
BASOPHILS NFR BLD AUTO: 0.4 % — SIGNIFICANT CHANGE UP (ref 0–2)
C TRACH RRNA SPEC QL NAA+PROBE: SIGNIFICANT CHANGE UP
CULTURE RESULTS: SIGNIFICANT CHANGE UP
EOSINOPHIL # BLD AUTO: 0.3 K/UL — SIGNIFICANT CHANGE UP (ref 0–0.5)
EOSINOPHIL NFR BLD AUTO: 2.6 % — SIGNIFICANT CHANGE UP (ref 0–6)
HCT VFR BLD CALC: 34.5 % — SIGNIFICANT CHANGE UP (ref 34.5–45)
HGB BLD-MCNC: 11.8 G/DL — SIGNIFICANT CHANGE UP (ref 11.5–15.5)
LYMPHOCYTES # BLD AUTO: 1.7 K/UL — SIGNIFICANT CHANGE UP (ref 1–3.3)
LYMPHOCYTES # BLD AUTO: 16.6 % — SIGNIFICANT CHANGE UP (ref 13–44)
MCHC RBC-ENTMCNC: 33.5 PG — SIGNIFICANT CHANGE UP (ref 27–34)
MCHC RBC-ENTMCNC: 34.1 GM/DL — SIGNIFICANT CHANGE UP (ref 32–36)
MCV RBC AUTO: 98.1 FL — SIGNIFICANT CHANGE UP (ref 80–100)
MONOCYTES # BLD AUTO: 1.1 K/UL — HIGH (ref 0–0.9)
MONOCYTES NFR BLD AUTO: 10.9 % — SIGNIFICANT CHANGE UP (ref 2–14)
N GONORRHOEA RRNA SPEC QL NAA+PROBE: SIGNIFICANT CHANGE UP
NEUTROPHILS # BLD AUTO: 7.2 K/UL — SIGNIFICANT CHANGE UP (ref 1.8–7.4)
NEUTROPHILS NFR BLD AUTO: 69.5 % — SIGNIFICANT CHANGE UP (ref 43–77)
PLATELET # BLD AUTO: 261 K/UL — SIGNIFICANT CHANGE UP (ref 150–400)
RBC # BLD: 3.51 M/UL — LOW (ref 3.8–5.2)
RBC # FLD: 11.1 % — SIGNIFICANT CHANGE UP (ref 10.3–14.5)
SPECIMEN SOURCE: SIGNIFICANT CHANGE UP
SPECIMEN SOURCE: SIGNIFICANT CHANGE UP
WBC # BLD: 10.3 K/UL — SIGNIFICANT CHANGE UP (ref 3.8–10.5)
WBC # FLD AUTO: 10.3 K/UL — SIGNIFICANT CHANGE UP (ref 3.8–10.5)

## 2019-01-01 PROCEDURE — 87491 CHLMYD TRACH DNA AMP PROBE: CPT

## 2019-01-01 PROCEDURE — 96376 TX/PRO/DX INJ SAME DRUG ADON: CPT | Mod: XU

## 2019-01-01 PROCEDURE — 83605 ASSAY OF LACTIC ACID: CPT

## 2019-01-01 PROCEDURE — 84702 CHORIONIC GONADOTROPIN TEST: CPT

## 2019-01-01 PROCEDURE — 76830 TRANSVAGINAL US NON-OB: CPT

## 2019-01-01 PROCEDURE — 99284 EMERGENCY DEPT VISIT MOD MDM: CPT | Mod: 25

## 2019-01-01 PROCEDURE — 87591 N.GONORRHOEAE DNA AMP PROB: CPT

## 2019-01-01 PROCEDURE — 74177 CT ABD & PELVIS W/CONTRAST: CPT

## 2019-01-01 PROCEDURE — 84132 ASSAY OF SERUM POTASSIUM: CPT

## 2019-01-01 PROCEDURE — 99217: CPT

## 2019-01-01 PROCEDURE — 81001 URINALYSIS AUTO W/SCOPE: CPT

## 2019-01-01 PROCEDURE — 93975 VASCULAR STUDY: CPT

## 2019-01-01 PROCEDURE — 84295 ASSAY OF SERUM SODIUM: CPT

## 2019-01-01 PROCEDURE — 87086 URINE CULTURE/COLONY COUNT: CPT

## 2019-01-01 PROCEDURE — 87040 BLOOD CULTURE FOR BACTERIA: CPT

## 2019-01-01 PROCEDURE — G0378: CPT

## 2019-01-01 PROCEDURE — 96375 TX/PRO/DX INJ NEW DRUG ADDON: CPT | Mod: XU

## 2019-01-01 PROCEDURE — 85014 HEMATOCRIT: CPT

## 2019-01-01 PROCEDURE — 85027 COMPLETE CBC AUTOMATED: CPT

## 2019-01-01 PROCEDURE — 82947 ASSAY GLUCOSE BLOOD QUANT: CPT

## 2019-01-01 PROCEDURE — 82803 BLOOD GASES ANY COMBINATION: CPT

## 2019-01-01 PROCEDURE — 80053 COMPREHEN METABOLIC PANEL: CPT

## 2019-01-01 PROCEDURE — 82435 ASSAY OF BLOOD CHLORIDE: CPT

## 2019-01-01 PROCEDURE — 96374 THER/PROPH/DIAG INJ IV PUSH: CPT | Mod: XU

## 2019-01-01 PROCEDURE — 82330 ASSAY OF CALCIUM: CPT

## 2019-01-01 RX ADMIN — SODIUM CHLORIDE 3 MILLILITER(S): 9 INJECTION INTRAMUSCULAR; INTRAVENOUS; SUBCUTANEOUS at 08:29

## 2019-01-01 RX ADMIN — MORPHINE SULFATE 4 MILLIGRAM(S): 50 CAPSULE, EXTENDED RELEASE ORAL at 08:29

## 2019-01-01 RX ADMIN — SODIUM CHLORIDE 125 MILLILITER(S): 9 INJECTION, SOLUTION INTRAVENOUS at 08:30

## 2019-01-01 NOTE — ED CDU PROVIDER DISPOSITION NOTE - NSFOLLOWUPINSTRUCTIONS_ED_ALL_ED_FT
1.  Stay well hydrated  2.  Take Tylenol 650mgs every 4-6 hrs and/or Ibuprofen 600mgs every 6 hrs as needed for mild to moderate pain.  Take Oxycodone 5mgs every 6 hrs as needed for severe pain  3.  Take Doxycycline 100mgs every 12 hrs x 14 days  4.  Follow up with your PMD in 2-3 days       Follow up with your OBGYN upon discharge  5.  Return to the ER for worsening pain, fevers/chills or any other concerning symptoms

## 2019-01-01 NOTE — ED ADULT NURSE REASSESSMENT NOTE - NS ED NURSE REASSESS COMMENT FT1
Pt d/c Amb Denies pain To f/u with GYN
Pt. currently awaiting to be seen by CDU. Safety and comfort measures maintained.
Received asleep easily arousable Denies discomfort Spouse at bedside
Report given to CDU NADIA Golden . Patient aware of bed assignment. Patient currently comfortable. VSS. Awaiting transport to CDU.
Report received from Pau ZUNIGA . Pt AAOx4, NAD, resp nonlabored, skin warm/dry, resting comfortably in bed with family at bedside. Pt c/o abd pain, states pain is better than earlier . Pt denies headache, dizziness, chest pain, palpitations, SOB, abd pain, n/v/d, urinary symptoms, fevers, chills, weakness at this time. Pt awaiting doxycycline to complete, then pt. will be d/c . Safety maintained.
Pt reports that her pain is about a 2/10 currently. Pt reports "feeling better until she has to pee and then she has dysuria." VSS. Will continue to monitor pt.
Pt received from NADIA Adame. Pt oriented to CDU & plan of care was discussed. Pt A&O x 4. Pt in CDU for pain control, and abdominal exams. Pt states she has 2/10 pain in her lower abdomen. Pt states that her pain increases right after she voids and burns as well. Pt bowel sounds heard in all four quads, and tenderness to suprapubic area. Family at bedside. Safety & comfort measures maintained. Call bell in reach. Will continue to monitor.

## 2019-01-01 NOTE — ED CDU PROVIDER SUBSEQUENT DAY NOTE - PHYSICAL EXAMINATION
Gen: AAO x 3, NAD  Skin: No rashes or lesions  HEENT: NC/AT, PERRLA, EOMI, MMM  Resp: unlabored CTAB  Cardiac: rrr s1s2, no murmurs, rubs or gallops  GI: ND, +BS, Soft, Mild LLQ ttp  Ext: no pedal edema, FROM in all extremities  Neuro: no focal deficits

## 2019-01-01 NOTE — ED CDU PROVIDER SUBSEQUENT DAY NOTE - HISTORY
CDU PROGRESS NOTE PA ASHLEY: Pt NAD resting comfortably, feeling well. Pt states she has 2/10 pain in her lower abdomen. Pt bowel sounds heard in all four quads, abdomen soft, non distented, no rebound or guarding. Patient declined pain medication. Will continue to monitor.

## 2019-01-01 NOTE — ED CDU PROVIDER DISPOSITION NOTE - ATTENDING CONTRIBUTION TO CARE
Attending MD Whittaker:   I personally have seen and examined this patient.  Physician assistant note reviewed and agree on plan of care and except where noted.  See below for details.     Seen in CDU 8    33F in the CDU for abdominal pain, IVFs, serial abdominal exams, pain control after presenting to the ED with lower abdominal pain.  Reports was sent in after outpatient US.  WBC improved.  Seen by Gyn early PID vs hemorrhagic cyst.  Given Ceftriaxone and Doxycyline and Gyn recommended 14d of Doxy.  Feels improved.  On exam, NAD, head NCAT, PERRL, FROM at neck, no tenderness to midline palpation, no stepoffs along length of spine, lungs CTAB with good inspiratory effort, +S1S2, no m/r/g, abdomen soft with +BS, minimal tenderness in lower abdomen, ND, no CVAT, moving all extremities with 5/5 strength bilateral upper and lower extremities, good and equal  strength bilaterally; A/P: 33F with lower abdominal pain, as per Gyn PID vs inflammation from hemorrhagic cyst, Rx Doxy.  Stable for discharge. Follow up instructions given, importance of follow up emphasized, return to ED parameters reviewed and patient verbalized understanding.  All questions answered, all concerns addressed.

## 2019-01-01 NOTE — ED CDU PROVIDER DISPOSITION NOTE - CLINICAL COURSE
33y f w PMhx D&C on Nov 1 for miscarriage, endometritis s/p dnc now s/p antibx course, p/w severe lower abd pain over the last few days and acutely worsening since yesterday. Pt seen by OB today who could not effectively interpret TVUS and sent her to ED for further evaluation. Pt has extreme pain in lower abdomen suprapubic and umbilical region, and in both L and R adnexa. No fevers or chills. Nausea but no emesis. No diarrhea or constipation, BMs normal.   In ED, patient had laboratory significant for wbc 17.1, Transvaginal US showing No ultrasound evidence for endometritis or retained products of conception. A 1.5 x 2.2 cm complex cyst at the right adnexa. Adjacent to this there is a small amount of mildly complex fluid likely blood. This likely represent small ruptured hemorrhagic cyst, and may be responsible for the patient's pain. Patient also had CT abdomen and pelvis w/ cont showing No acute pathology. Patient was evaluated by GYN and sent to CDU. Recommendations for Urine GC/CT, empiric abx for PID: Ceftriaxone 250mg IM x1 + Doxycycline 100mg bid for 14d, motrin 600mg po q6h and tylenol 975mg po q6h for pain. Repeat CBC ............ 33y f w PMhx D&C on Nov 1 for miscarriage, endometritis s/p dnc now s/p antibx course, p/w severe lower abd pain over the last few days and acutely worsening since yesterday. Pt seen by OB today who could not effectively interpret TVUS and sent her to ED for further evaluation. Pt has extreme pain in lower abdomen suprapubic and umbilical region, and in both L and R adnexa. No fevers or chills. Nausea but no emesis. No diarrhea or constipation, BMs normal.   In ED, patient had laboratory significant for wbc 17.1, Transvaginal US showing No ultrasound evidence for endometritis or retained products of conception. A 1.5 x 2.2 cm complex cyst at the right adnexa. Adjacent to this there is a small amount of mildly complex fluid likely blood. This likely represent small ruptured hemorrhagic cyst, and may be responsible for the patient's pain. Patient also had CT abdomen and pelvis w/ cont showing No acute pathology. Patient was evaluated by GYN and sent to CDU. Recommendations for Urine GC/CT, empiric abx for PID: Ceftriaxone 250mg IM x1 + Doxycycline 100mg bid for 14d, motrin 600mg po q6h and tylenol 975mg po q6h for pain. Patient's pain well controlled in the CDU overnight.  Leukocytosis resolved.  Patient tolerating PO and medically stable for discharge home

## 2019-01-01 NOTE — ED CDU PROVIDER SUBSEQUENT DAY NOTE - PROGRESS NOTE DETAILS
CDU PROGRESS NOTE PA ASHLEY: Pt resting comfortably, without complaint. NAD, VSS. abdomen soft, non distended, no rebound, +bowel sounds throughout. Will continue to monitor. Patient seen at bedside in NAD.  VSS.  Patient resting comfortably without complaints. Abd pain has improved.  Patient tolerating PO.  Will DC on Doxy with close outpatient follow up.  -Cornelio Sow PA-C

## 2019-01-01 NOTE — ED CDU PROVIDER DISPOSITION NOTE - PLAN OF CARE
Take Doxycycline 100mg twice daily for 14 days   - motrin 600mg po q6h and tylenol 975mg po q6h for pain  Follow up with your GYN within the next 2 days for revaluation   Bring a copy of your test results with you to your appointment  Return to the Emergency Room if you experience new or worsening symptoms 1.  Stay well hydrated  2.  Take Tylenol 650mgs every 4-6 hrs and/or Ibuprofen 600mgs every 6 hrs as needed for mild to moderate pain.  Take Oxycodone 5mgs every 6 hrs as needed for severe pain  3.  Take Doxycycline 100mgs every 12 hrs x 14 days  4.  Follow up with your PMD in 2-3 days       Follow up with your OBGYN upon discharge  5.  Return to the ER for worsening pain, fevers/chills or any other concerning symptoms

## 2019-01-06 LAB
CULTURE RESULTS: SIGNIFICANT CHANGE UP
CULTURE RESULTS: SIGNIFICANT CHANGE UP
SPECIMEN SOURCE: SIGNIFICANT CHANGE UP
SPECIMEN SOURCE: SIGNIFICANT CHANGE UP

## 2020-02-17 ENCOUNTER — RESULT REVIEW (OUTPATIENT)
Age: 35
End: 2020-02-17

## 2020-03-05 PROBLEM — Z00.00 ENCOUNTER FOR PREVENTIVE HEALTH EXAMINATION: Status: ACTIVE | Noted: 2020-03-05

## 2020-03-09 ENCOUNTER — APPOINTMENT (OUTPATIENT)
Dept: HUMAN REPRODUCTION | Facility: CLINIC | Age: 35
End: 2020-03-09
Payer: COMMERCIAL

## 2020-03-09 PROCEDURE — 99205 OFFICE O/P NEW HI 60 MIN: CPT | Mod: 25

## 2020-03-09 PROCEDURE — 76830 TRANSVAGINAL US NON-OB: CPT

## 2020-03-12 ENCOUNTER — APPOINTMENT (OUTPATIENT)
Dept: OBGYN | Facility: CLINIC | Age: 35
End: 2020-03-12

## 2021-02-08 ENCOUNTER — APPOINTMENT (OUTPATIENT)
Dept: OBGYN | Facility: CLINIC | Age: 36
End: 2021-02-08

## 2021-02-08 DIAGNOSIS — Z87.42 PERSONAL HISTORY OF OTHER DISEASES OF THE FEMALE GENITAL TRACT: ICD-10-CM

## 2021-02-08 DIAGNOSIS — N97.9 FEMALE INFERTILITY, UNSPECIFIED: ICD-10-CM

## 2021-02-08 DIAGNOSIS — Z87.59 PERSONAL HISTORY OF OTHER COMPLICATIONS OF PREGNANCY, CHILDBIRTH AND THE PUERPERIUM: ICD-10-CM

## 2021-02-08 DIAGNOSIS — Z78.9 OTHER SPECIFIED HEALTH STATUS: ICD-10-CM

## 2021-02-08 DIAGNOSIS — F17.210 NICOTINE DEPENDENCE, CIGARETTES, UNCOMPLICATED: ICD-10-CM

## 2021-02-08 DIAGNOSIS — Z80.7 FAMILY HISTORY OF OTHER MALIGNANT NEOPLASMS OF LYMPHOID, HEMATOPOIETIC AND RELATED TISSUES: ICD-10-CM

## 2021-02-08 DIAGNOSIS — Z33.2 ENCOUNTER FOR ELECTIVE TERMINATION OF PREGNANCY: ICD-10-CM

## 2021-02-08 DIAGNOSIS — Z82.49 FAMILY HISTORY OF ISCHEMIC HEART DISEASE AND OTHER DISEASES OF THE CIRCULATORY SYSTEM: ICD-10-CM

## 2021-02-08 LAB — CYTOLOGY CVX/VAG DOC THIN PREP: NEGATIVE

## 2021-02-15 ENCOUNTER — APPOINTMENT (OUTPATIENT)
Dept: OBGYN | Facility: CLINIC | Age: 36
End: 2021-02-15

## 2021-02-16 ENCOUNTER — RESULT REVIEW (OUTPATIENT)
Age: 36
End: 2021-02-16

## 2022-06-08 ENCOUNTER — NON-APPOINTMENT (OUTPATIENT)
Age: 37
End: 2022-06-08

## 2022-08-13 ENCOUNTER — NON-APPOINTMENT (OUTPATIENT)
Age: 37
End: 2022-08-13

## 2022-09-07 NOTE — ED CDU PROVIDER INITIAL DAY NOTE - CROS ED CONS ALL NEG
Called parent to inform that the letter needed for school is available to be picked up at their convenience
negative...
Private car

## 2024-01-31 ENCOUNTER — NON-APPOINTMENT (OUTPATIENT)
Age: 39
End: 2024-01-31

## 2024-03-21 NOTE — PACU DISCHARGE NOTE - NS MD DISCHARGE NOTE DISCHARGE
Home
Show Spray Paint Technique Variable?: Yes
Spray Paint Text: The liquid nitrogen was applied to the skin utilizing a spray paint frosting technique.
Post-Care Instructions: I reviewed with the patient in detail post-care instructions. Patient is to wear sunprotection, and avoid picking at any of the treated lesions. Pt may apply Vaseline to crusted or scabbing areas.
Detail Level: Detailed
Add 52 Modifier (Optional): no
Medical Necessity Information: It is in your best interest to select a reason for this procedure from the list below. All of these items fulfill various CMS LCD requirements except the new and changing color options.
Consent: The patient's consent was obtained including but not limited to risks of crusting, scabbing, blistering, scarring, darker or lighter pigmentary change, recurrence, incomplete removal and infection.
Medical Necessity Clause: This procedure was medically necessary because the lesions that were treated were:
Aperture Size (Optional): C
Application Tool (Optional): Liquid Nitrogen Sprayer
Duration Of Freeze Thaw-Cycle (Seconds): 0
Number Of Freeze-Thaw Cycles: 1 freeze-thaw cycle

## 2024-06-19 NOTE — H&P PST ADULT - NSCAFFEINETYPE_GEN_ALL_CORE_SD
The patient is requesting for her most recent labs / office notes to be faxed over to [facility].She has an upcoming appointment with Dr. Torres on [id number] an he will like for any imaging that the office have to be faxed over as well. Patient did not provide the faxed number PSR gave her our office fax number instead.    coffee

## 2025-04-11 ENCOUNTER — NON-APPOINTMENT (OUTPATIENT)
Age: 40
End: 2025-04-11